# Patient Record
Sex: MALE | Race: BLACK OR AFRICAN AMERICAN | NOT HISPANIC OR LATINO | Employment: FULL TIME | URBAN - METROPOLITAN AREA
[De-identification: names, ages, dates, MRNs, and addresses within clinical notes are randomized per-mention and may not be internally consistent; named-entity substitution may affect disease eponyms.]

---

## 2022-04-25 ENCOUNTER — HOSPITAL ENCOUNTER (EMERGENCY)
Facility: HOSPITAL | Age: 22
Discharge: HOME | End: 2022-04-25
Attending: EMERGENCY MEDICINE
Payer: MEDICAID

## 2022-04-25 VITALS
HEART RATE: 62 BPM | BODY MASS INDEX: 31.83 KG/M2 | TEMPERATURE: 98 F | RESPIRATION RATE: 18 BRPM | DIASTOLIC BLOOD PRESSURE: 71 MMHG | HEIGHT: 68 IN | WEIGHT: 210 LBS | SYSTOLIC BLOOD PRESSURE: 122 MMHG | OXYGEN SATURATION: 100 %

## 2022-04-25 DIAGNOSIS — S20.229A CONTUSION OF BACK, UNSPECIFIED LATERALITY, INITIAL ENCOUNTER: ICD-10-CM

## 2022-04-25 DIAGNOSIS — S40.011A CONTUSION OF RIGHT SHOULDER, INITIAL ENCOUNTER: Primary | ICD-10-CM

## 2022-04-25 DIAGNOSIS — M25.519 SHOULDER PAIN: ICD-10-CM

## 2022-04-25 PROCEDURE — 99284 EMERGENCY DEPT VISIT MOD MDM: CPT | Mod: 25

## 2022-04-25 RX ORDER — IBUPROFEN 600 MG/1
600 TABLET ORAL EVERY 6 HOURS PRN
Qty: 20 TABLET | Refills: 0 | Status: SHIPPED | OUTPATIENT
Start: 2022-04-25 | End: 2022-04-30

## 2022-04-25 RX ORDER — CYCLOBENZAPRINE HCL 10 MG
10 TABLET ORAL 3 TIMES DAILY PRN
Qty: 20 TABLET | Refills: 0 | Status: SHIPPED | OUTPATIENT
Start: 2022-04-25 | End: 2022-05-02

## 2022-04-25 RX ORDER — LIDOCAINE 50 MG/G
1 PATCH TOPICAL DAILY
Qty: 15 PATCH | Refills: 0 | Status: SHIPPED | OUTPATIENT
Start: 2022-04-25 | End: 2022-05-10

## 2022-04-25 RX ORDER — ACETAMINOPHEN 500 MG
500 TABLET ORAL EVERY 4 HOURS PRN
Qty: 20 TABLET | Refills: 0 | Status: SHIPPED | OUTPATIENT
Start: 2022-04-25 | End: 2022-04-30

## 2022-04-25 NOTE — ED PROVIDER NOTES
"Encounter Date: 4/25/2022    SCRIBE #1 NOTE: I, Mercedes Newell, am scribing for, and in the presence of,  Aaliyah Sharma PA-C. I have scribed the following portions of the note - Other sections scribed: HPI, ROS, PE.       History     Chief Complaint   Patient presents with    Back Pain     EMS called to 23yo male that was injured at work last week. Stated that 20 pallets of water fell on him. Has been having lower back pain since the incident but went back to work today and tried to pick something up and stated that his back locked up. Was not seen at ED after initial injury     CC: back pain    HPI:  22 year old male with no pertinent PMHx presents to the ED via EMS for evaluation of midline lower back pain and right shoulder pain. The patient states that 20 pallets with 84 cases of water each fell onto him on Wednesday. He states that he was on a machine when the pallets fell. States that the machine was covered. He was hit in the right shoulder. States that he fell to the ground after he was hit. States that a pole fell onto his back after he fell. Reports head trauma. He reports that he was "dazed" for thirty minutes after the fall but denies loss of consciousness. The patient reports that he had right shoulder pain immediately. He reports that he is unable to lift his arm past the perpendicular secondary to pain. States that the back pain had gradual onset. He states that he returned to work today. Reports that the back pain worsened today when he tried to lift something and his back "locked up." Patient also endorses a headache that is present only after he wakes up. He denies neck pain, vision changes, nausea, vomiting, bowel or bladder incontinence, numbness, weakness, or other symptoms at this time. He reports taking Tylenol with no relief. No previous back injury reported.    The history is provided by the patient. No  was used.     Review of patient's allergies indicates:  No " Known Allergies  History reviewed. No pertinent past medical history.  History reviewed. No pertinent surgical history.  History reviewed. No pertinent family history.     Review of Systems   Constitutional: Negative for chills and fever.   HENT: Negative for congestion, ear pain, rhinorrhea and sore throat.    Eyes: Negative for redness and visual disturbance.   Respiratory: Negative for shortness of breath and stridor.    Cardiovascular: Negative for chest pain.   Gastrointestinal: Negative for abdominal pain, constipation, diarrhea, nausea and vomiting.   Genitourinary: Negative for dysuria, frequency, hematuria and urgency.   Musculoskeletal: Positive for back pain. Negative for neck pain.        (+) right shoulder pain   Skin: Negative for rash.   Neurological: Positive for headaches. Negative for dizziness, speech difficulty, weakness, light-headedness and numbness.   Hematological: Does not bruise/bleed easily.   Psychiatric/Behavioral: Negative for confusion.       Physical Exam     Initial Vitals [04/25/22 1634]   BP Pulse Resp Temp SpO2   132/76 76 18 99.1 °F (37.3 °C) 99 %      MAP       --         Physical Exam    Nursing note and vitals reviewed.  Constitutional: He appears well-developed and well-nourished. No distress.   HENT:   Head: Normocephalic.   Right Ear: Hearing and external ear normal.   Left Ear: Hearing and external ear normal.   Nose: Nose normal.   Mouth/Throat: No posterior oropharyngeal edema or posterior oropharyngeal erythema.   Eyes: Conjunctivae are normal.   Neck: Neck supple.   Cardiovascular: Normal rate and regular rhythm. Exam reveals no gallop and no friction rub.    No murmur heard.  Pulmonary/Chest: Breath sounds normal. No respiratory distress. He has no wheezes. He has no rhonchi. He has no rales.   Abdominal: Abdomen is soft. Bowel sounds are normal. He exhibits no distension. There is no abdominal tenderness. There is no rebound and no guarding.   Musculoskeletal:          General: Tenderness present.      Cervical back: Neck supple.      Comments: Midline L spine tenderness. No C-spine tenderness.  Diffuse tenderness over the right shoulder.     Lymphadenopathy:     He has no cervical adenopathy.   Neurological: He is alert.   Skin: Skin is warm and dry. No rash noted.   Psychiatric: He has a normal mood and affect.         ED Course   Procedures  Labs Reviewed - No data to display       Imaging Results          X-Ray Shoulder Trauma Right (Final result)  Result time 04/25/22 17:58:20    Final result by Juliana York MD (04/25/22 17:58:20)                 Impression:      No acute osseous abnormality identified.      Electronically signed by: Juliana York MD  Date:    04/25/2022  Time:    17:58             Narrative:    EXAMINATION:  XR SHOULDER TRAUMA 3 VIEW RIGHT    CLINICAL HISTORY:  Pain in unspecified shoulder    TECHNIQUE:  Two views of the right shoulder were performed.    COMPARISON:  None    FINDINGS:  No evidence of acute displaced fracture, dislocation, or osseous destructive process.                               X-Ray Lumbar Spine Ap And Lateral (Final result)  Result time 04/25/22 17:57:47    Final result by Juliana York MD (04/25/22 17:57:47)                 Impression:      No acute lumbar spine abnormalities identified.      Electronically signed by: Juliana York MD  Date:    04/25/2022  Time:    17:57             Narrative:    EXAMINATION:  XR LUMBAR SPINE AP AND LATERAL    CLINICAL HISTORY:  lumbar back pain;    TECHNIQUE:  AP, lateral and spot images were performed of the lumbar spine.    COMPARISON:  None    FINDINGS:  Lumbar spine alignment is within normal limits.  No evidence of acute lumbar spine fracture or subluxation.  Intervertebral disc spaces appear fairly well maintained.  Visualized sacrum is unremarkable.                                 Medications - No data to display  Medical Decision Making:   ED Management:  22-year-old male  presenting for shoulder pain and back pain and status post injury that occurred at work last week.  He reports that pallets of water fell onto him while he was riding in a machine.  A pole also fell onto his right shoulder.  He does report he fell out of the machine and hit his head.  No loss of consciousness, visual disturbance weakness, paresthesias, confusion, gait or speech difficulty.  No focal neurologic deficits.  X-ray of the shoulder and L-spine are negative for fracture dislocation.  Likely contusion.  No neurovascular deficits.  Will have patient follow-up with Occupational Medicine for further evaluation management.  The patient return emergency department worsening symptoms or as needed.          Scribe Attestation:   Scribe #1: I performed the above scribed service and the documentation accurately describes the services I performed. I attest to the accuracy of the note.                 Clinical Impression:   Final diagnoses:  [M25.519] Shoulder pain  [S40.011A] Contusion of right shoulder, initial encounter (Primary)  [S20.229A] Contusion of back, unspecified laterality, initial encounter          ED Disposition Condition    Discharge Stable       I, Aaliyah Sharma PA-C , personally performed the services described in this documentation. All medical record entries made by the scribe were at my direction and in my presence. I have reviewed the chart and agree that the record reflects my personal performance and is accurate and complete.  ED Prescriptions     Medication Sig Dispense Start Date End Date Auth. Provider    ibuprofen (ADVIL,MOTRIN) 600 MG tablet Take 1 tablet (600 mg total) by mouth every 6 (six) hours as needed for Pain. 20 tablet 4/25/2022 4/30/2022 Aaliyah Sharma PA-C    acetaminophen (TYLENOL) 500 MG tablet Take 1 tablet (500 mg total) by mouth every 4 (four) hours as needed. 20 tablet 4/25/2022 4/30/2022 Aaliyah Sharma PA-C    cyclobenzaprine (FLEXERIL) 10 MG tablet  Take 1 tablet (10 mg total) by mouth 3 (three) times daily as needed. 20 tablet 4/25/2022 5/2/2022 Aaliyah Sharma PA-C    LIDOcaine (LIDODERM) 5 % Place 1 patch onto the skin once daily. Remove & Discard patch within 12 hours or as directed by MD. May use 4% over the counter if not covered by insurance for 15 days 15 patch 4/25/2022 5/10/2022 Aaliyah Sharma PA-C        Follow-up Information     Follow up With Specialties Details Why Contact Info    Your Primary Care Doctor  Schedule an appointment as soon as possible for a visit in 2 days      [facility] Emergency Medicine Go to  As needed, If symptoms worsen [address]  [facility] [address] [phone]    Isac Mcdonald MD Orthopedic Surgery Schedule an appointment as soon as possible for a visit  for follow up with orthopedics [address] [facility]  [address]  [facility] [address] [phone]      Elver Almazan MD Orthopedic Surgery, Hand Surgery Schedule an appointment as soon as possible for a visit  for orthopedics follow up [address] [facility] [address] [phone]      [facility]  Schedule an appointment as soon as possible for a visit in 1 day for follow up [address] [facility]  [phone]           Aaliyah Sharma PA-C  04/26/22 1001

## 2022-04-25 NOTE — ED TRIAGE NOTES
Pt. Complains of right shoulder and lower back pain. Pt. States on Wednesday approximately 20 pallades fell on him and he thought he was fine. Pt. States that today he tried to lift something at work and his back locked up on him. Pt. Rates his pain at a 9/10 on the pain scale.

## 2022-04-25 NOTE — Clinical Note
"Chilango Duartedann Hurt was seen and treated in our emergency department on 4/25/2022.  He may return to work on 04/26/2022.       If you have any questions or concerns, please don't hesitate to call.      Aaliyah Sharma PA-C"

## 2024-08-02 NOTE — DISCHARGE INSTRUCTIONS
If this is being considered a work related injury, please follow up with Occupational Health tomorrow for evaluation and clearance to return to work.   Return to ER for worsening symptoms or as needed    Thank you for coming to our Emergency Department today. It is important to remember that some problems are difficult to diagnose and may not be found during your Emergency Department visit. Be sure to follow up with your primary care doctor and review all labs/imaging/tests that were performed during this visit with them. Some labs/tests may be outside of the normal range and require non-emergent follow-up and further investigation to help diagnose/exclude/prevent complications or other medical conditions.    If you do not have a primary care doctor, you may contact the one listed on your discharge paperwork or you may also call the [facility] Appointment Desk at [phone] to schedule an appointment and establish care with one. It is important to your health that you have a primary care doctor.    Please take all medications as directed. All medications may potentially have side-effects and it is impossible to predict which medications may give you side-effects or what side-effects (if any) they will give you.. If you feel that you are having a negative effect or side-effect of any medication you should immediately stop taking them and seek medical attention. If you feel that you are having a life-threatening reaction call 241.    Return to the ER with any questions/concerns, new/concerning symptoms, worsening or failure to improve.     Do not drive, swim, climb to height, take a bath or make any important decisions for 24 hours if you have received any pain medications, sedatives or mood altering drugs during your ER visit.     Note updated to reflect use of [employer]an Freestyle 14 day CGM.  VJ